# Patient Record
Sex: FEMALE | URBAN - METROPOLITAN AREA
[De-identification: names, ages, dates, MRNs, and addresses within clinical notes are randomized per-mention and may not be internally consistent; named-entity substitution may affect disease eponyms.]

---

## 2018-05-25 ENCOUNTER — APPOINTMENT (RX ONLY)
Dept: URBAN - METROPOLITAN AREA CLINIC 148 | Facility: CLINIC | Age: 58
Setting detail: DERMATOLOGY
End: 2018-05-25

## 2018-05-25 PROBLEM — C44.722 SQUAMOUS CELL CARCINOMA OF SKIN OF RIGHT LOWER LIMB, INCLUDING HIP: Status: ACTIVE | Noted: 2018-05-25

## 2018-05-25 PROBLEM — L85.3 XEROSIS CUTIS: Status: ACTIVE | Noted: 2018-05-25

## 2018-05-25 PROBLEM — E13.9 OTHER SPECIFIED DIABETES MELLITUS WITHOUT COMPLICATIONS: Status: ACTIVE | Noted: 2018-05-25

## 2018-05-25 PROCEDURE — 13121 CMPLX RPR S/A/L 2.6-7.5 CM: CPT

## 2018-05-25 PROCEDURE — 11602 EXC TR-EXT MAL+MARG 1.1-2 CM: CPT

## 2018-05-25 PROCEDURE — ? EXCISION

## 2018-05-25 NOTE — PROCEDURE: EXCISION
Body Location Override (Optional - Billing Will Still Be Based On Selected Body Map Location If Applicable): Right Thigh

## 2018-05-25 NOTE — HPI: PROCEDURE (SKIN SURGERY)
Can pt be accommodated this week?  
Has The Growth Been Previously Biopsied?: has been previously biopsied
Body Location Override (Optional): Right Thigh

## 2018-06-08 ENCOUNTER — APPOINTMENT (RX ONLY)
Dept: URBAN - METROPOLITAN AREA CLINIC 148 | Facility: CLINIC | Age: 58
Setting detail: DERMATOLOGY
End: 2018-06-08

## 2018-06-08 DIAGNOSIS — L57.0 ACTINIC KERATOSIS: ICD-10-CM

## 2018-06-08 DIAGNOSIS — L82.1 OTHER SEBORRHEIC KERATOSIS: ICD-10-CM

## 2018-06-08 DIAGNOSIS — L98.9 DISORDER OF THE SKIN AND SUBCUTANEOUS TISSUE, UNSPECIFIED: ICD-10-CM

## 2018-06-08 DIAGNOSIS — Z48.02 ENCOUNTER FOR REMOVAL OF SUTURES: ICD-10-CM

## 2018-06-08 DIAGNOSIS — Z87.2 PERSONAL HISTORY OF DISEASES OF THE SKIN AND SUBCUTANEOUS TISSUE: ICD-10-CM

## 2018-06-08 DIAGNOSIS — L30.9 DERMATITIS, UNSPECIFIED: ICD-10-CM

## 2018-06-08 DIAGNOSIS — L82.0 INFLAMED SEBORRHEIC KERATOSIS: ICD-10-CM

## 2018-06-08 DIAGNOSIS — L81.4 OTHER MELANIN HYPERPIGMENTATION: ICD-10-CM

## 2018-06-08 DIAGNOSIS — Z85.828 PERSONAL HISTORY OF OTHER MALIGNANT NEOPLASM OF SKIN: ICD-10-CM

## 2018-06-08 PROCEDURE — 11100: CPT | Mod: 59

## 2018-06-08 PROCEDURE — ? SUTURE REMOVAL (NO GLOBAL PERIOD)

## 2018-06-08 PROCEDURE — 17110 DESTRUCTION B9 LES UP TO 14: CPT

## 2018-06-08 PROCEDURE — 17000 DESTRUCT PREMALG LESION: CPT | Mod: 59

## 2018-06-08 PROCEDURE — ? BIOPSY BY SHAVE METHOD

## 2018-06-08 PROCEDURE — 99214 OFFICE O/P EST MOD 30 MIN: CPT | Mod: 25

## 2018-06-08 PROCEDURE — ? LIQUID NITROGEN

## 2018-06-08 PROCEDURE — ? PRESCRIPTION

## 2018-06-08 PROCEDURE — ? COUNSELING

## 2018-06-08 RX ORDER — TRIAMCINOLONE ACETONIDE 1 MG/G
CREAM TOPICAL BID
Qty: 1 | Refills: 0 | Status: ERX | COMMUNITY
Start: 2018-06-08

## 2018-06-08 RX ADMIN — TRIAMCINOLONE ACETONIDE: 1 CREAM TOPICAL at 13:41

## 2018-06-08 ASSESSMENT — LOCATION ZONE DERM
LOCATION ZONE: LEG
LOCATION ZONE: TRUNK
LOCATION ZONE: LIP
LOCATION ZONE: NECK

## 2018-06-08 ASSESSMENT — LOCATION DETAILED DESCRIPTION DERM
LOCATION DETAILED: LEFT INFERIOR LATERAL NECK
LOCATION DETAILED: SUPERIOR THORACIC SPINE
LOCATION DETAILED: RIGHT ANTERIOR DISTAL THIGH
LOCATION DETAILED: LEFT SUPERIOR VERMILION BORDER
LOCATION DETAILED: RIGHT SUPERIOR LATERAL UPPER BACK

## 2018-06-08 ASSESSMENT — LOCATION SIMPLE DESCRIPTION DERM
LOCATION SIMPLE: RIGHT BACK
LOCATION SIMPLE: LEFT ANTERIOR NECK
LOCATION SIMPLE: UPPER BACK
LOCATION SIMPLE: RIGHT THIGH
LOCATION SIMPLE: LEFT UPPER LIP

## 2018-06-08 NOTE — PROCEDURE: LIQUID NITROGEN
Duration Of Freeze Thaw-Cycle (Seconds): 3
Post-Care Instructions: I reviewed with the patient in detail post-care instructions. Patient is to wear sunprotection, and avoid picking at any of the treated lesions. Pt may apply Vaseline to crusted or scabbing areas.
Number Of Freeze-Thaw Cycles: 2 freeze-thaw cycles
Consent: The patient's consent was obtained including but not limited to risks of crusting, scabbing, blistering, scarring, darker or lighter pigmentary change, recurrence, incomplete removal and infection.
Render Post-Care Instructions In Note?: no
Detail Level: Simple
Medical Necessity Information: It is in your best interest to select a reason for this procedure from the list below. All of these items fulfill various CMS LCD requirements except the new and changing color options.
Medical Necessity Clause: This procedure was medically necessary because the lesions that were treated were: at risk for and/or subject to recurrent physical trauma as a result of lesion type and location with history of the same, bleeding and irritated.
Detail Level: Detailed

## 2018-06-08 NOTE — PROCEDURE: BIOPSY BY SHAVE METHOD
Post-Care Instructions: I reviewed with the patient in detail post-care instructions. Patient is to keep the biopsy site dry overnight, and then apply vaseline with bandaid twice daily until healed.

## 2018-06-08 NOTE — PROCEDURE: SUTURE REMOVAL (NO GLOBAL PERIOD)
Body Location Override (Optional - Billing Will Still Be Based On Selected Body Map Location If Applicable): right thigh
Detail Level: Detailed

## 2018-10-09 NOTE — PROCEDURE: EXCISION
no Graft Donor Site Bandage (Optional-Leave Blank If You Don't Want In Note): Steri-strips and a pressure bandage were applied to the donor site.

## 2019-05-24 ENCOUNTER — IMPORTED ENCOUNTER (OUTPATIENT)
Dept: URBAN - METROPOLITAN AREA CLINIC 31 | Facility: CLINIC | Age: 59
End: 2019-05-24

## 2019-05-24 PROBLEM — H25.13: Noted: 2019-05-24

## 2019-05-24 PROBLEM — E11.9: Noted: 2019-05-24

## 2019-05-24 PROCEDURE — 92004 COMPRE OPH EXAM NEW PT 1/>: CPT

## 2019-05-24 NOTE — PATIENT DISCUSSION
NS Cataract OU: Discussed the risks benefits alternatives and limitations of cataract surgery including infection bleeding loss of vision retinal tears detachment. The patient stated a full understanding and a desire to proceed with the procedure in both eyes. Refractive options were reviewed. Patient has elected to be optimized for distance vision in both eyes. The patient will still need glasses for reading and to possibly fine tune distance vision. Brochure to pt on surgical options not covered by insurance. Sched KPE/IOL OD/OS. SX options will be reviewed further at time of admit. Pt. will see Dr. Elma Villa for PO appointments.

## 2019-06-21 ENCOUNTER — IMPORTED ENCOUNTER (OUTPATIENT)
Dept: URBAN - METROPOLITAN AREA CLINIC 31 | Facility: CLINIC | Age: 59
End: 2019-06-21

## 2019-06-21 PROBLEM — H25.13: Noted: 2019-06-21

## 2019-06-21 PROCEDURE — 92025 CPTRIZED CORNEAL TOPOGRAPHY: CPT

## 2019-06-21 PROCEDURE — 76519 ECHO EXAM OF EYE: CPT

## 2019-07-09 ENCOUNTER — IMPORTED ENCOUNTER (OUTPATIENT)
Dept: URBAN - METROPOLITAN AREA CLINIC 31 | Facility: CLINIC | Age: 59
End: 2019-07-09

## 2019-07-09 PROBLEM — Z96.1: Noted: 2019-07-09

## 2019-07-09 PROCEDURE — 99024 POSTOP FOLLOW-UP VISIT: CPT

## 2019-07-09 NOTE — PATIENT DISCUSSION
Post-Op Day #1 - Cataract Surgery Right Eye (OD) - doing well. Elevated IOP - drop of Combigan. Tears prn. Continue postop drops as directed. Call office with symptoms of pain redness or decreased vision in operative eye.  Cat surgery OS next week

## 2019-07-16 ENCOUNTER — IMPORTED ENCOUNTER (OUTPATIENT)
Dept: URBAN - METROPOLITAN AREA CLINIC 31 | Facility: CLINIC | Age: 59
End: 2019-07-16

## 2019-07-16 PROBLEM — Z96.1: Noted: 2019-07-16

## 2019-07-16 PROCEDURE — 99024 POSTOP FOLLOW-UP VISIT: CPT

## 2019-07-16 NOTE — PATIENT DISCUSSION
Post-Op Day #1 - Cataract Surgery Left Eye (OS) - doing well. Tears prn. Continue postop drops as directed. Call office with symptoms of pain redness or decreased vision in operative eye.  1 week f/uDr Julianna Leary

## 2019-07-24 ENCOUNTER — IMPORTED ENCOUNTER (OUTPATIENT)
Dept: URBAN - METROPOLITAN AREA CLINIC 31 | Facility: CLINIC | Age: 59
End: 2019-07-24

## 2019-07-24 PROBLEM — Z96.1: Noted: 2019-07-24

## 2019-07-24 PROCEDURE — 99024 POSTOP FOLLOW-UP VISIT: CPT

## 2019-07-24 NOTE — PATIENT DISCUSSION
1.  Post-Op Week #1 - Cataract Surgery Left Eye (OS) -  Intraocular lens stable and surgery very well healed. Patient to resume all normal activities. Finish postop drops as directed. Final Refraction given if necessary. Call with any problems. 2. Post-Op Week #2 -  Cataract Surgery Right Eye (OD) - Intraocular lens stable and surgery very well healed. Patient to resume all normal activities. Finish postop drops as directed. Final Refraction given if necessary.

## 2019-08-30 ENCOUNTER — IMPORTED ENCOUNTER (OUTPATIENT)
Dept: URBAN - METROPOLITAN AREA CLINIC 31 | Facility: CLINIC | Age: 59
End: 2019-08-30

## 2019-08-30 PROBLEM — Z96.1: Noted: 2019-08-30

## 2019-08-30 PROCEDURE — 99024 POSTOP FOLLOW-UP VISIT: CPT

## 2019-10-24 ENCOUNTER — IMPORTED ENCOUNTER (OUTPATIENT)
Dept: URBAN - METROPOLITAN AREA CLINIC 31 | Facility: CLINIC | Age: 59
End: 2019-10-24

## 2019-10-24 PROBLEM — H53.8: Noted: 2019-10-24

## 2019-10-24 PROCEDURE — 92014 COMPRE OPH EXAM EST PT 1/>: CPT

## 2019-10-24 NOTE — PATIENT DISCUSSION
Blurred Vision OS - photopsia OS. NOrmal fundus exam. Observe. signs of RD discussed. F/u PRN sooner.  Has appt 1/2020

## 2020-01-10 ENCOUNTER — IMPORTED ENCOUNTER (OUTPATIENT)
Dept: URBAN - METROPOLITAN AREA CLINIC 31 | Facility: CLINIC | Age: 60
End: 2020-01-10

## 2020-01-10 PROBLEM — Z96.1: Noted: 2020-01-10

## 2020-01-10 PROBLEM — H53.8: Noted: 2020-01-10

## 2020-01-10 PROBLEM — H26.493: Noted: 2020-01-10

## 2020-01-10 PROCEDURE — 99214 OFFICE O/P EST MOD 30 MIN: CPT

## 2020-01-10 PROCEDURE — 92015 DETERMINE REFRACTIVE STATE: CPT

## 2020-01-10 NOTE — PATIENT DISCUSSION
1.  Pseudophakia OU - IOLs stable. Monitorfor changes in vision. 2. PCO OU: (Posterior Capsule Opacification)  Not visually significant at this time. Monitor for yag capsulotomy necessity. Return for an appointment in 1 year for comprehensive exam. with Dr. Mima Wong.

## 2022-04-02 ASSESSMENT — VISUAL ACUITY
OU_CC: 20/20
OD_SC: 20/40
OS_GLARE: 20/40MED
OS_CC: 20/40
OD_PH: SC 20/25 -1
OD_CC: 20/25
OS_SC: 20/30
OS_CC: 20/50
OS_CC: 20/50+2
OS_CC: 20/25-1
OS_CC: 20/400
OD_CC: 20/40
OS_PH: SC 20/25 -2
OD_CC: 20/40+1
OD_GLARE: 20/60-2MED
OS_CC: 20/25-1
OD_CC: 20/25-2
OD_CC: 20/400
OD_CC: 20/25-2
OS_CC: 20/400
OS_PH: SC 20/25 -3
OD_CC: 20/30-2

## 2022-04-02 ASSESSMENT — TONOMETRY
OS_IOP_MMHG: 21
OS_IOP_MMHG: 14
OD_IOP_MMHG: 14
OD_IOP_MMHG: 19
OS_IOP_MMHG: 15
OS_IOP_MMHG: 27
OD_IOP_MMHG: 39
OD_IOP_MMHG: 14
OD_IOP_MMHG: 14
OS_IOP_MMHG: 14

## 2022-05-27 NOTE — PROCEDURE: EXCISION
22 Rotation Flap Text: The defect edges were debeveled with a #15 scalpel blade.  Given the location of the defect, shape of the defect and the proximity to free margins a rotation flap was deemed most appropriate.  Using a sterile surgical marker, an appropriate rotation flap was drawn incorporating the defect and placing the expected incisions within the relaxed skin tension lines where possible.    The area thus outlined was incised deep to adipose tissue with a #15 scalpel blade.  The skin margins were undermined to an appropriate distance in all directions utilizing iris scissors.

## 2023-04-04 NOTE — PROCEDURE: EXCISION
Stelara Counseling:  I discussed with the patient the risks of ustekinumab including but not limited to immunosuppression, malignancy, posterior leukoencephalopathy syndrome, and serious infections.  The patient understands that monitoring is required including a PPD at baseline and must alert us or the primary physician if symptoms of infection or other concerning signs are noted. W Plasty Text: The lesion was extirpated to the level of the fat with a #15 scalpel blade.  Given the location of the defect, shape of the defect and the proximity to free margins a W-plasty was deemed most appropriate for repair.  Using a sterile surgical marker, the appropriate transposition arms of the W-plasty were drawn incorporating the defect and placing the expected incisions within the relaxed skin tension lines where possible.    The area thus outlined was incised deep to adipose tissue with a #15 scalpel blade.  The skin margins were undermined to an appropriate distance in all directions utilizing iris scissors.  The opposing transposition arms were then transposed into place in opposite direction and anchored with interrupted buried subcutaneous sutures.

## 2024-04-10 ENCOUNTER — NEW PATIENT (OUTPATIENT)
Dept: URBAN - METROPOLITAN AREA CLINIC 29 | Facility: CLINIC | Age: 64
End: 2024-04-10

## 2024-04-10 DIAGNOSIS — H53.8: ICD-10-CM

## 2024-04-10 PROCEDURE — 99204 OFFICE O/P NEW MOD 45 MIN: CPT

## 2024-04-10 ASSESSMENT — VISUAL ACUITY
OD_SC: 20/60
OS_SC: 20/60
OD_GLARE: 20/100
OS_GLARE: 20/100

## 2024-04-10 ASSESSMENT — TONOMETRY
OS_IOP_MMHG: 15
OD_IOP_MMHG: 15

## 2024-04-22 ENCOUNTER — SURGERY/PROCEDURE (OUTPATIENT)
Dept: URBAN - METROPOLITAN AREA SURGERY 17 | Facility: SURGERY | Age: 64
End: 2024-04-22

## 2024-04-22 DIAGNOSIS — H26.493: ICD-10-CM

## 2024-04-22 PROCEDURE — 66821 AFTER CATARACT LASER SURGERY: CPT

## 2024-07-22 ENCOUNTER — FOLLOW UP (OUTPATIENT)
Dept: URBAN - METROPOLITAN AREA CLINIC 29 | Facility: CLINIC | Age: 64
End: 2024-07-22

## 2024-07-22 DIAGNOSIS — Z96.1: ICD-10-CM

## 2024-07-22 PROCEDURE — 99214 OFFICE O/P EST MOD 30 MIN: CPT

## 2024-07-22 ASSESSMENT — TONOMETRY
OD_IOP_MMHG: 18
OS_IOP_MMHG: 18

## 2024-07-22 ASSESSMENT — VISUAL ACUITY
OD_SC: 20/40
OS_SC: 20/40+2

## 2025-07-28 ENCOUNTER — COMPREHENSIVE EXAM (OUTPATIENT)
Age: 65
End: 2025-07-28

## 2025-07-28 DIAGNOSIS — H17.9: ICD-10-CM

## 2025-07-28 DIAGNOSIS — Z96.1: ICD-10-CM

## 2025-07-28 PROCEDURE — 92014 COMPRE OPH EXAM EST PT 1/>: CPT
